# Patient Record
Sex: FEMALE | Race: WHITE | HISPANIC OR LATINO | URBAN - METROPOLITAN AREA
[De-identification: names, ages, dates, MRNs, and addresses within clinical notes are randomized per-mention and may not be internally consistent; named-entity substitution may affect disease eponyms.]

---

## 2019-09-24 ENCOUNTER — APPOINTMENT (RX ONLY)
Age: 64
Setting detail: DERMATOLOGY
End: 2019-09-24

## 2019-09-24 ENCOUNTER — APPOINTMENT (OUTPATIENT)
Age: 64
Setting detail: DERMATOLOGY
End: 2019-09-24

## 2019-09-24 VITALS
DIASTOLIC BLOOD PRESSURE: 90 MMHG | SYSTOLIC BLOOD PRESSURE: 130 MMHG | SYSTOLIC BLOOD PRESSURE: 130 MMHG | DIASTOLIC BLOOD PRESSURE: 90 MMHG

## 2019-09-24 DIAGNOSIS — L57.8 OTHER SKIN CHANGES DUE TO CHRONIC EXPOSURE TO NONIONIZING RADIATION: ICD-10-CM

## 2019-09-24 DIAGNOSIS — D22 MELANOCYTIC NEVI: ICD-10-CM

## 2019-09-24 DIAGNOSIS — L82.1 OTHER SEBORRHEIC KERATOSIS: ICD-10-CM

## 2019-09-24 DIAGNOSIS — L81.4 OTHER MELANIN HYPERPIGMENTATION: ICD-10-CM

## 2019-09-24 DIAGNOSIS — D485 NEOPLASM OF UNCERTAIN BEHAVIOR OF SKIN: ICD-10-CM

## 2019-09-24 PROBLEM — D22.5 MELANOCYTIC NEVI OF TRUNK: Status: ACTIVE | Noted: 2019-09-24

## 2019-09-24 PROBLEM — F41.9 ANXIETY DISORDER, UNSPECIFIED: Status: ACTIVE | Noted: 2019-09-24

## 2019-09-24 PROBLEM — D48.5 NEOPLASM OF UNCERTAIN BEHAVIOR OF SKIN: Status: ACTIVE | Noted: 2019-09-24

## 2019-09-24 PROBLEM — I10 ESSENTIAL (PRIMARY) HYPERTENSION: Status: ACTIVE | Noted: 2019-09-24

## 2019-09-24 PROBLEM — E03.9 HYPOTHYROIDISM, UNSPECIFIED: Status: ACTIVE | Noted: 2019-09-24

## 2019-09-24 PROBLEM — D22.62 MELANOCYTIC NEVI OF LEFT UPPER LIMB, INCLUDING SHOULDER: Status: ACTIVE | Noted: 2019-09-24

## 2019-09-24 PROBLEM — E78.5 HYPERLIPIDEMIA, UNSPECIFIED: Status: ACTIVE | Noted: 2019-09-24

## 2019-09-24 PROCEDURE — ? BIOPSY BY SHAVE METHOD

## 2019-09-24 PROCEDURE — ? BIOPSY BY PUNCH METHOD

## 2019-09-24 PROCEDURE — ? COUNSELING

## 2019-09-24 PROCEDURE — 11104 PUNCH BX SKIN SINGLE LESION: CPT

## 2019-09-24 PROCEDURE — 99203 OFFICE O/P NEW LOW 30 MIN: CPT | Mod: 25

## 2019-09-24 PROCEDURE — 11103 TANGNTL BX SKIN EA SEP/ADDL: CPT

## 2019-09-24 ASSESSMENT — LOCATION SIMPLE DESCRIPTION DERM
LOCATION SIMPLE: RIGHT UPPER BACK
LOCATION SIMPLE: RIGHT CHEEK
LOCATION SIMPLE: RIGHT FOREARM
LOCATION SIMPLE: LEFT SHOULDER
LOCATION SIMPLE: RIGHT THIGH
LOCATION SIMPLE: RIGHT FOREHEAD
LOCATION SIMPLE: LEFT CHEEK
LOCATION SIMPLE: LEFT LOWER BACK
LOCATION SIMPLE: RIGHT UPPER BACK
LOCATION SIMPLE: LEFT UPPER BACK
LOCATION SIMPLE: RIGHT FOREARM
LOCATION SIMPLE: CHEST
LOCATION SIMPLE: ABDOMEN
LOCATION SIMPLE: LEFT CALF
LOCATION SIMPLE: RIGHT CHEEK
LOCATION SIMPLE: LEFT HAND
LOCATION SIMPLE: ABDOMEN
LOCATION SIMPLE: RIGHT THIGH
LOCATION SIMPLE: LEFT FOREHEAD
LOCATION SIMPLE: LEFT FOREARM
LOCATION SIMPLE: LEFT UPPER BACK
LOCATION SIMPLE: CHEST
LOCATION SIMPLE: LEFT CHEEK
LOCATION SIMPLE: LEFT SHOULDER
LOCATION SIMPLE: RIGHT FOREHEAD
LOCATION SIMPLE: LEFT FOREHEAD
LOCATION SIMPLE: LEFT FOREARM
LOCATION SIMPLE: LEFT LOWER BACK
LOCATION SIMPLE: LEFT HAND
LOCATION SIMPLE: LEFT CALF

## 2019-09-24 ASSESSMENT — LOCATION DETAILED DESCRIPTION DERM
LOCATION DETAILED: LEFT PROXIMAL DORSAL FOREARM
LOCATION DETAILED: SUBXIPHOID
LOCATION DETAILED: SUBXIPHOID
LOCATION DETAILED: LEFT MEDIAL SUPERIOR CHEST
LOCATION DETAILED: LEFT INFERIOR MEDIAL MALAR CHEEK
LOCATION DETAILED: LEFT DISTAL CALF
LOCATION DETAILED: LEFT INFERIOR MEDIAL FOREHEAD
LOCATION DETAILED: LEFT POSTERIOR SHOULDER
LOCATION DETAILED: RIGHT LATERAL FOREHEAD
LOCATION DETAILED: LEFT INFERIOR MEDIAL FOREHEAD
LOCATION DETAILED: RIGHT PROXIMAL DORSAL FOREARM
LOCATION DETAILED: LEFT MID-UPPER BACK
LOCATION DETAILED: RIGHT SUPERIOR UPPER BACK
LOCATION DETAILED: LEFT POSTERIOR SHOULDER
LOCATION DETAILED: RIGHT LATERAL FOREHEAD
LOCATION DETAILED: LEFT INFERIOR MEDIAL MIDBACK
LOCATION DETAILED: RIGHT PROXIMAL DORSAL FOREARM
LOCATION DETAILED: LEFT MEDIAL SUPERIOR CHEST
LOCATION DETAILED: RIGHT LATERAL ABDOMEN
LOCATION DETAILED: RIGHT LATERAL ABDOMEN
LOCATION DETAILED: RIGHT INFERIOR UPPER BACK
LOCATION DETAILED: LEFT MID-UPPER BACK
LOCATION DETAILED: RIGHT ANTERIOR DISTAL THIGH
LOCATION DETAILED: LEFT PROXIMAL DORSAL FOREARM
LOCATION DETAILED: EPIGASTRIC SKIN
LOCATION DETAILED: LEFT RADIAL DORSAL HAND
LOCATION DETAILED: LEFT INFERIOR MEDIAL MALAR CHEEK
LOCATION DETAILED: RIGHT INFERIOR UPPER BACK
LOCATION DETAILED: RIGHT ANTERIOR DISTAL THIGH
LOCATION DETAILED: EPIGASTRIC SKIN
LOCATION DETAILED: RIGHT SUPERIOR MEDIAL MALAR CHEEK
LOCATION DETAILED: LEFT DISTAL CALF
LOCATION DETAILED: LEFT INFERIOR MEDIAL MIDBACK
LOCATION DETAILED: LEFT RADIAL DORSAL HAND
LOCATION DETAILED: RIGHT SUPERIOR UPPER BACK
LOCATION DETAILED: RIGHT SUPERIOR MEDIAL MALAR CHEEK

## 2019-09-24 ASSESSMENT — LOCATION ZONE DERM
LOCATION ZONE: HAND
LOCATION ZONE: FACE
LOCATION ZONE: TRUNK
LOCATION ZONE: ARM
LOCATION ZONE: TRUNK
LOCATION ZONE: LEG
LOCATION ZONE: HAND
LOCATION ZONE: LEG
LOCATION ZONE: ARM
LOCATION ZONE: FACE

## 2019-09-24 NOTE — PROCEDURE: BIOPSY BY SHAVE METHOD
Depth Of Biopsy: dermis
Additional Anesthesia Volume In Cc (Will Not Render If 0): 0
Cryotherapy Text: The wound bed was treated with cryotherapy after the biopsy was performed.
Notification Instructions: Patient will be notified of biopsy results. However, patient instructed to call the office if not contacted within 2 weeks.
Anesthesia Type: 0.5% lidocaine with 1:100,000 epinephrine and a 1:10 solution of 8.4% sodium bicarbonate
Size Of Lesion In Cm: 0.6
Destruction After The Procedure: No
Lab: [id number]
Consent: Written consent was obtained and risks were reviewed including but not limited to scarring, infection, bleeding, scabbing, incomplete removal, nerve damage and allergy to anesthesia.
Silver Nitrate Text: The wound bed was treated with silver nitrate after the biopsy was performed.
Detail Level: Detailed
Biopsy Method: double edge Personna blade
Hemostasis: Aluminum Chloride
Was A Bandage Applied: Yes
Dressing: bandage
Electrodesiccation And Curettage Text: The wound bed was treated with electrodesiccation and curettage after the biopsy was performed.
Anesthesia Volume In Cc: 0.5
Type Of Destruction Used: Curettage
Path Notes (To The Dermatopathologist): 0.6cm
Billing Type: Third-Party Bill
Biopsy Type: H and E
Electrodesiccation Text: The wound bed was treated with electrodesiccation after the biopsy was performed.
Post-Care Instructions: I reviewed with the patient in detail post-care instructions. Patient is to keep the biopsy site dry overnight, and then apply bacitracin twice daily until healed. Patient may apply hydrogen peroxide soaks to remove any crusting.
Wound Care: Bacitracin
Lab Facility: [id number]

## 2019-09-24 NOTE — PROCEDURE: BIOPSY BY PUNCH METHOD
Render Path Notes In Note?: No
Biopsy Type: H and E
Billing Type: Third-Party Bill
Hemostasis: None
Lab: [id number]
Dressing: pressure dressing with telfa
Post-Care Instructions: I reviewed with the patient in detail post-care instructions. Patient is to keep the biopsy site dry overnight, and then apply bacitracin twice daily until healed. Patient may apply hydrogen peroxide soaks to remove any crusting.
Anesthesia Type: 0.5% lidocaine with 1:200,000 epinephrine and a 1:10 solution of 8.4% sodium bicarbonate
Additional Anesthesia Volume In Cc (Will Not Render If 0): 0
Lab Facility: [id number]
Size Of Lesion In Cm (Optional): 0.6
Home Suture Removal Text: Patient was provided a home suture removal kit and will remove their sutures at home.  If they have any questions or difficulties they will call the office.
Path Notes (To The Dermatopathologist): 0.6cm
Detail Level: Detailed
Punch Size In Mm: 2
Suture Removal: 7 days
Consent: Written consent was obtained and risks were reviewed including but not limited to scarring, infection, bleeding, scabbing, incomplete removal, nerve damage and allergy to anesthesia.
Anesthesia Volume In Cc: 0.5
Notification Instructions: Patient will be notified of biopsy results. However, patient instructed to call the office if not contacted within 2 weeks.
Wound Care: Bacitracin
Was A Bandage Applied: Yes
Epidermal Sutures: 6-0 Nylon

## 2019-09-24 NOTE — PROCEDURE: BIOPSY BY SHAVE METHOD
Billing Type: Third-Party Bill
Biopsy Method: double edge Personna blade
Render Post-Care Instructions In Note?: no
Electrodesiccation And Curettage Text: The wound bed was treated with electrodesiccation and curettage after the biopsy was performed.
Anesthesia Type: 0.5% lidocaine with 1:100,000 epinephrine and a 1:10 solution of 8.4% sodium bicarbonate
Electrodesiccation Text: The wound bed was treated with electrodesiccation after the biopsy was performed.
Anesthesia Volume In Cc: 0.5
Consent: Written consent was obtained and risks were reviewed including but not limited to scarring, infection, bleeding, scabbing, incomplete removal, nerve damage and allergy to anesthesia.
Path Notes (To The Dermatopathologist): 0.6cm
Silver Nitrate Text: The wound bed was treated with silver nitrate after the biopsy was performed.
X Size Of Lesion In Cm: 0
Notification Instructions: Patient will be notified of biopsy results. However, patient instructed to call the office if not contacted within 2 weeks.
Cryotherapy Text: The wound bed was treated with cryotherapy after the biopsy was performed.
Dressing: bandage
Size Of Lesion In Cm: 0.6
Detail Level: Detailed
Depth Of Biopsy: dermis
Post-Care Instructions: I reviewed with the patient in detail post-care instructions. Patient is to keep the biopsy site dry overnight, and then apply bacitracin twice daily until healed. Patient may apply hydrogen peroxide soaks to remove any crusting.
Type Of Destruction Used: Curettage
Biopsy Type: H and E
Was A Bandage Applied: Yes
Wound Care: Bacitracin
Hemostasis: Aluminum Chloride

## 2019-10-02 ENCOUNTER — APPOINTMENT (OUTPATIENT)
Age: 64
Setting detail: DERMATOLOGY
End: 2019-10-02

## 2019-10-02 ENCOUNTER — APPOINTMENT (RX ONLY)
Age: 64
Setting detail: DERMATOLOGY
End: 2019-10-02

## 2019-10-02 DIAGNOSIS — Z41.9 ENCOUNTER FOR PROCEDURE FOR PURPOSES OTHER THAN REMEDYING HEALTH STATE, UNSPECIFIED: ICD-10-CM

## 2019-10-02 PROCEDURE — ? PRESCRIPTION

## 2019-10-02 PROCEDURE — ? COSMETIC CONSULTATION: BOTULINUM TOXIN

## 2019-10-02 PROCEDURE — ? MICRODERMABRASION

## 2019-10-02 PROCEDURE — ? PRODUCT LINE (HYPERPIGMENTATION)

## 2019-10-02 PROCEDURE — ? COSMETIC CONSULTATION: CHEMICAL PEELS

## 2019-10-02 RX ORDER — HYDROQUINONE 4 %
CREAM (GRAM) TOPICAL
Qty: 2 | Refills: 0 | Status: ACTIVE

## 2019-10-02 ASSESSMENT — LOCATION DETAILED DESCRIPTION DERM
LOCATION DETAILED: LEFT FOREHEAD
LOCATION DETAILED: NASAL SUPRATIP
LOCATION DETAILED: LEFT RADIAL DORSAL HAND
LOCATION DETAILED: RIGHT SUPERIOR LATERAL MALAR CHEEK
LOCATION DETAILED: RIGHT INFERIOR CENTRAL MALAR CHEEK
LOCATION DETAILED: LEFT SUPERIOR CENTRAL MALAR CHEEK
LOCATION DETAILED: LEFT SUPERIOR CENTRAL MALAR CHEEK
LOCATION DETAILED: LEFT RADIAL DORSAL HAND
LOCATION DETAILED: RIGHT ULNAR DORSAL HAND
LOCATION DETAILED: LEFT FOREHEAD
LOCATION DETAILED: NASAL SUPRATIP
LOCATION DETAILED: RIGHT SUPERIOR LATERAL MALAR CHEEK
LOCATION DETAILED: RIGHT INFERIOR CENTRAL MALAR CHEEK
LOCATION DETAILED: RIGHT ULNAR DORSAL HAND
LOCATION DETAILED: LEFT INFERIOR CENTRAL MALAR CHEEK
LOCATION DETAILED: LEFT INFERIOR CENTRAL MALAR CHEEK

## 2019-10-02 ASSESSMENT — LOCATION SIMPLE DESCRIPTION DERM
LOCATION SIMPLE: RIGHT CHEEK
LOCATION SIMPLE: LEFT FOREHEAD
LOCATION SIMPLE: LEFT HAND
LOCATION SIMPLE: LEFT HAND
LOCATION SIMPLE: LEFT CHEEK
LOCATION SIMPLE: NOSE
LOCATION SIMPLE: LEFT FOREHEAD
LOCATION SIMPLE: NOSE
LOCATION SIMPLE: LEFT CHEEK
LOCATION SIMPLE: RIGHT CHEEK
LOCATION SIMPLE: RIGHT HAND
LOCATION SIMPLE: RIGHT HAND

## 2019-10-02 ASSESSMENT — LOCATION ZONE DERM
LOCATION ZONE: FACE
LOCATION ZONE: FACE
LOCATION ZONE: NOSE
LOCATION ZONE: HAND
LOCATION ZONE: NOSE
LOCATION ZONE: HAND

## 2019-10-02 NOTE — PROCEDURE: PRODUCT LINE (HYPERPIGMENTATION)
Product 6 Application Directions: Apply at bedtime to entire face 3 x week. Protect skin from the sun and apply SPF QD
Product 57 Units: 0
Product 5 Application Directions: Apply to entire face BID
Product 4 Application Directions: Cleanse QAM
Product 2 Application Directions: Apply to entire face QAM
Product 26 Price (In Dollars - Numeric Only, No Special Characters Or $): 140
Product 65 Price (In Dollars - Numeric Only, No Special Characters Or $): 0.00
Name Of Product 16: Wrinkle texture repair
Product 3 Price (In Dollars - Numeric Only, No Special Characters Or $): 71.36
Name Of Product 12: Calming toner
Name Of Product 3: Exfoliating polish
Product 23 Application Directions: Apply to entire face 3-4 x week at bedtime
Name Of Product 28: Colorscience Total Eye
Product 1 Price (In Dollars - Numeric Only, No Special Characters Or $): 120
Name Of Product 21: Colorscience Face Shield
Name Of Product 22: Colorscience Lip Shine SPF
Product 8 Price (In Dollars - Numeric Only, No Special Characters Or $): 85.20
Product 2 Price (In Dollars - Numeric Only, No Special Characters Or $): 125.00
Product 3 Application Directions: 2-3 times a week in the AM
Product 17 Application Directions: Apply QAM
Name Of Product 6: Retinol Brightener 0.5%
Product 8 Application Directions: Scrub face QAM in shower
Name Of Product 26: Refissa 40mg
Product 7 Price (In Dollars - Numeric Only, No Special Characters Or $): 64.00
Render Product Pricing In Note: Yes
Name Of Product 29: Acne Kit
Name Of Product 4: Clarisonic Maile 2
Product 27 Application Directions: 3 x week after cleansing
Product 11 Price (In Dollars - Numeric Only, No Special Characters Or $): 69.23
Name Of Product 18: Growth Factor Serum
Product 13 Price (In Dollars - Numeric Only, No Special Characters Or $): 54.32
Product 12 Application Directions: Apply after cleansing BID
Product 15 Price (In Dollars - Numeric Only, No Special Characters Or $): 63.90
Product 25 Application Directions: QHS PRN
Name Of Product 27: Exfoliating polish travel size
Product 7 Units: 1
Name Of Product 11: SPF 30 Primer
Product 23 Price (In Dollars - Numeric Only, No Special Characters Or $): 110
Product 4 Price (In Dollars - Numeric Only, No Special Characters Or $): 158.70
Product 7 Application Directions: Apply to entire face QHS
Name Of Product 24: Oil Control pads
Product 27 Price (In Dollars - Numeric Only, No Special Characters Or $): 19.17
Name Of Product 9: Smartone SPF 50
Name Of Product 2: Melamin C RX with HQ 4%
Product 24 Application Directions: BID post cleansing
Product 9 Price (In Dollars - Numeric Only, No Special Characters Or $): 74.55
Product 24 Price (In Dollars - Numeric Only, No Special Characters Or $): 66.03
Product 6 Price (In Dollars - Numeric Only, No Special Characters Or $): 110.76
Product 16 Application Directions: Apply QHS
Product 16 Price (In Dollars - Numeric Only, No Special Characters Or $): 154.42
Name Of Product 19: C-Bright
Product 22 Application Directions: Apply to lips QAM
Name Of Product 17: Exfoliation accelerator
Product 28 Price (In Dollars - Numeric Only, No Special Characters Or $): 73.49
Product 25 Price (In Dollars - Numeric Only, No Special Characters Or $): 155.15
Product 21 Application Directions: Apply QAM before makeup
Name Of Product 13: Complexion Renewal pads
Name Of Product 10: Daily Power Defense
Product 26 Application Directions: QHS as tolerated
Product 10 Price (In Dollars - Numeric Only, No Special Characters Or $): 159.76
Name Of Product 14: Gentle cleanser
Name Of Product 5: Pigment Control HQ4%
Product 29 Application Directions: Apply as directed
Detail Level: Zone
Product 22 Price (In Dollars - Numeric Only, No Special Characters Or $): 30.89
Product 19 Application Directions: Apply to entire face BID alternating with HQ4% Melamin C and Melamix to prevent rebound Hyperpigmentation.
Name Of Product 7: Pigment control blending HQ4%
Product 1 Application Directions: 1 pump at bedtime every QHS
Product 18 Application Directions: Apply to entire face QHS alternating with Tretinoin
Product 15 Application Directions: Apply BID after toner
Name Of Product 23: Tretinoin 0.05%
Product 14 Application Directions: Apply to face after cleansing
Product 20 Application Directions: Cleanse face qhs
Name Of Product 8: Vitascrub
Product 18 Price (In Dollars - Numeric Only, No Special Characters Or $): 157.12
Name Of Product 25: Wrinkle and texture repair
Product 13 Application Directions: Apply to entire face after wash BID
Name Of Product 15: Pore refiner
Product 19 Price (In Dollars - Numeric Only, No Special Characters Or $): 99.03
Product 12 Price (In Dollars - Numeric Only, No Special Characters Or $): 39.41
Product 5 Price (In Dollars - Numeric Only, No Special Characters Or $): 66.00
Name Of Product 1: Brightenex 1%
Product 21 Price (In Dollars - Numeric Only, No Special Characters Or $): 41.54
Product 29 Price (In Dollars - Numeric Only, No Special Characters Or $): 149.8
Product 14 Price (In Dollars - Numeric Only, No Special Characters Or $): 47.93

## 2019-10-02 NOTE — PROCEDURE: MICRODERMABRASION
Endpoint: mild erythema
Consent: Written consent obtained, risks reviewed including but not limited to crusting, scabbing, blistering, scarring, darker or lighter pigmentary change, bruising, and/or incomplete response.
Wand: Medium
Vacuum Pressure Units: inches Hg
Comments: Stimulator peel applied to face and dorsal hands
Number Of Passes: 2
Prep Text: The patients skin was cleaned and prepped.
Treatment Number: 1
Post-Care Instructions: I reviewed with the patient in detail post-care instructions. Patient should stay away from the sun and wear sun protection until treated areas are fully healed.\\Phillip abrasive solutions such as Glycolic acid, retinoic acid and all wash cloths, sponges and or scrubs must be avoided.
Vacuum Pressure: 15
Price (Use Numbers Only, No Special Characters Or $): 80
Indication: skin texture
Detail Level: Zone

## 2019-10-02 NOTE — HPI: COSMETIC (MICRODERMABRASION)
previous_has_had_previous_microdermabrasion
When Outside In The Sun, Do You...: always burns, never tans

## 2019-10-02 NOTE — PROCEDURE: MICRODERMABRASION
Detail Level: Zone
Vacuum Pressure Units: inches Hg
Wand: Medium
Post-Care Instructions: I reviewed with the patient in detail post-care instructions. Patient should stay away from the sun and wear sun protection until treated areas are fully healed.\\José Antonio abrasive solutions such as Glycolic acid, retinoic acid and all wash cloths, sponges and or scrubs must be avoided.
Comments: Stimulator peel applied to face and dorsal hands
Endpoint: mild erythema
Vacuum Pressure: 15
Consent: Written consent obtained, risks reviewed including but not limited to crusting, scabbing, blistering, scarring, darker or lighter pigmentary change, bruising, and/or incomplete response.
Indication: skin texture
Price (Use Numbers Only, No Special Characters Or $): 80
Number Of Passes: 2
Treatment Number: 1
Prep Text: The patients skin was cleaned and prepped.

## 2019-10-02 NOTE — PROCEDURE: PRODUCT LINE (HYPERPIGMENTATION)
Product 21 Application Directions: Apply QAM before makeup
Product 4 Application Directions: Cleanse QAM
Product 21 Units: 1
Product 48 Price (In Dollars - Numeric Only, No Special Characters Or $): 0.00
Product 61 Units: 0
Product 27 Price (In Dollars - Numeric Only, No Special Characters Or $): 19.17
Product 15 Price (In Dollars - Numeric Only, No Special Characters Or $): 63.90
Product 2 Price (In Dollars - Numeric Only, No Special Characters Or $): 125.00
Product 7 Price (In Dollars - Numeric Only, No Special Characters Or $): 64.00
Name Of Product 22: Colorscience Lip Shine SPF
Product 18 Application Directions: Apply to entire face QHS alternating with Tretinoin
Product 24 Application Directions: BID post cleansing
Product 12 Application Directions: Apply after cleansing BID
Product 21 Price (In Dollars - Numeric Only, No Special Characters Or $): 41.54
Name Of Product 10: Daily Power Defense
Name Of Product 5: Pigment Control HQ4%
Product 27 Application Directions: 3 x week after cleansing
Send Charges To Patient Encounter: Yes
Name Of Product 25: Wrinkle and texture repair
Name Of Product 21: Colorscience Face Shield
Product 15 Application Directions: Apply BID after toner
Product 22 Price (In Dollars - Numeric Only, No Special Characters Or $): 30.89
Product 7 Application Directions: Apply to entire face QHS
Product 17 Application Directions: Apply QAM
Name Of Product 13: Complexion Renewal pads
Product 2 Application Directions: Apply to entire face QAM
Product 10 Price (In Dollars - Numeric Only, No Special Characters Or $): 159.56
Product 18 Price (In Dollars - Numeric Only, No Special Characters Or $): 157.77
Name Of Product 28: Colorscience Total Eye
Product 25 Price (In Dollars - Numeric Only, No Special Characters Or $): 155.15
Product 13 Price (In Dollars - Numeric Only, No Special Characters Or $): 54.32
Product 5 Price (In Dollars - Numeric Only, No Special Characters Or $): 66.00
Product 20 Application Directions: Cleanse face qhs
Name Of Product 16: Wrinkle texture repair
Name Of Product 8: Vitascrub
Name Of Product 3: Exfoliating polish
Product 22 Application Directions: Apply to lips QAM
Name Of Product 18: Growth Factor Serum
Product 17 Price (In Dollars - Numeric Only, No Special Characters Or $): 74.55
Product 28 Price (In Dollars - Numeric Only, No Special Characters Or $): 73.49
Product 8 Price (In Dollars - Numeric Only, No Special Characters Or $): 85.20
Product 16 Price (In Dollars - Numeric Only, No Special Characters Or $): 154.42
Product 5 Application Directions: Apply to entire face BID
Product 20 Price (In Dollars - Numeric Only, No Special Characters Or $): 140
Name Of Product 23: Tretinoin 0.05%
Product 13 Application Directions: Apply to entire face after wash BID
Product 25 Application Directions: QHS PRN
Product 3 Price (In Dollars - Numeric Only, No Special Characters Or $): 71.36
Name Of Product 11: SPF 30 Primer
Name Of Product 17: Exfoliation accelerator
Name Of Product 1: Brightenex 1%
Product 23 Price (In Dollars - Numeric Only, No Special Characters Or $): 110
Product 11 Price (In Dollars - Numeric Only, No Special Characters Or $): 69.23
Name Of Product 26: Refissa 40mg
Name Of Product 6: Retinol Brightener 0.5%
Name Of Product 14: Gentle cleanser
Product 16 Application Directions: Apply QHS
Product 8 Application Directions: Scrub face QAM in shower
Product 3 Application Directions: 2-3 times a week in the AM
Product 14 Price (In Dollars - Numeric Only, No Special Characters Or $): 47.93
Product 6 Price (In Dollars - Numeric Only, No Special Characters Or $): 110.76
Name Of Product 29: Acne Kit
Product 1 Price (In Dollars - Numeric Only, No Special Characters Or $): 120
Product 19 Application Directions: Apply to entire face BID alternating with HQ4% Melamin C and Melamix to prevent rebound Hyperpigmentation.
Name Of Product 9: Smartone SPF 50
Product 23 Application Directions: Apply to entire face 3-4 x week at bedtime
Detail Level: Zone
Name Of Product 4: Clarisonic Veronica 2
Product 29 Price (In Dollars - Numeric Only, No Special Characters Or $): 149.8
Name Of Product 24: Oil Control pads
Product 26 Application Directions: QHS as tolerated
Product 14 Application Directions: Apply to face after cleansing
Product 19 Price (In Dollars - Numeric Only, No Special Characters Or $): 99.03
Name Of Product 12: Calming toner
Product 1 Application Directions: 1 pump at bedtime every QHS
Product 4 Price (In Dollars - Numeric Only, No Special Characters Or $): 158.92
Product 6 Application Directions: Apply at bedtime to entire face 3 x week. Protect skin from the sun and apply SPF QD
Product 24 Price (In Dollars - Numeric Only, No Special Characters Or $): 66.03
Product 12 Price (In Dollars - Numeric Only, No Special Characters Or $): 39.41
Name Of Product 27: Exfoliating polish travel size
Name Of Product 2: Melamin C RX with HQ 4%
Name Of Product 15: Pore refiner
Name Of Product 7: Pigment control blending HQ4%
Product 29 Application Directions: Apply as directed
Name Of Product 19: C-Bright

## 2019-10-17 NOTE — PROCEDURE: BIOPSY BY PUNCH METHOD
Family called and informed of patient's progress at start of case    GONZALEZ WAS GIVEN TO THE STERILE FIELD TO BE USED BY MD DURING PROCEDURE  REF: [id number]  LOT: [id number]  EXP: 6/28/24
X Size Of Lesion In Cm (Optional): 0
Patient Will Remove Sutures At Home?: No
Dressing: pressure dressing with telfa
Biopsy Type: H and E
Size Of Lesion In Cm (Optional): 0.6
Was A Bandage Applied: Yes
Suture Removal: 7 days
Detail Level: Detailed
Anesthesia Volume In Cc: 0.5
Wound Care: Bacitracin
Path Notes (To The Dermatopathologist): 0.6cm
Epidermal Sutures: 6-0 Nylon
Consent: Written consent was obtained and risks were reviewed including but not limited to scarring, infection, bleeding, scabbing, incomplete removal, nerve damage and allergy to anesthesia.
Punch Size In Mm: 2
Hemostasis: None
Billing Type: Third-Party Bill
Anesthesia Type: 0.5% lidocaine with 1:200,000 epinephrine and a 1:10 solution of 8.4% sodium bicarbonate
Home Suture Removal Text: Patient was provided a home suture removal kit and will remove their sutures at home.  If they have any questions or difficulties they will call the office.
Notification Instructions: Patient will be notified of biopsy results. However, patient instructed to call the office if not contacted within 2 weeks.
Post-Care Instructions: I reviewed with the patient in detail post-care instructions. Patient is to keep the biopsy site dry overnight, and then apply bacitracin twice daily until healed. Patient may apply hydrogen peroxide soaks to remove any crusting.

## 2021-01-25 ENCOUNTER — APPOINTMENT (RX ONLY)
Dept: URBAN - METROPOLITAN AREA CLINIC 77 | Facility: CLINIC | Age: 66
Setting detail: DERMATOLOGY
End: 2021-01-25

## 2021-01-25 ENCOUNTER — APPOINTMENT (OUTPATIENT)
Age: 66
Setting detail: DERMATOLOGY
End: 2021-01-25

## 2021-01-25 ENCOUNTER — APPOINTMENT (RX ONLY)
Dept: URBAN - METROPOLITAN AREA CLINIC 168 | Facility: CLINIC | Age: 66
Setting detail: DERMATOLOGY
End: 2021-01-25

## 2021-01-25 DIAGNOSIS — L29.89 OTHER PRURITUS: ICD-10-CM

## 2021-01-25 DIAGNOSIS — D485 NEOPLASM OF UNCERTAIN BEHAVIOR OF SKIN: ICD-10-CM

## 2021-01-25 PROBLEM — L29.8 OTHER PRURITUS: Status: ACTIVE | Noted: 2021-01-25

## 2021-01-25 PROBLEM — D48.5 NEOPLASM OF UNCERTAIN BEHAVIOR OF SKIN: Status: ACTIVE | Noted: 2021-01-25

## 2021-01-25 PROBLEM — I10 ESSENTIAL (PRIMARY) HYPERTENSION: Status: ACTIVE | Noted: 2021-01-25

## 2021-01-25 PROCEDURE — ? PRESCRIPTION MEDICATION MANAGEMENT

## 2021-01-25 PROCEDURE — 99202 OFFICE O/P NEW SF 15 MIN: CPT | Mod: 25

## 2021-01-25 PROCEDURE — ? BIOPSY BY SHAVE METHOD

## 2021-01-25 PROCEDURE — ? COUNSELING

## 2021-01-25 PROCEDURE — 11102 TANGNTL BX SKIN SINGLE LES: CPT

## 2021-01-25 ASSESSMENT — LOCATION ZONE DERM
LOCATION ZONE: SCALP

## 2021-01-25 ASSESSMENT — LOCATION SIMPLE DESCRIPTION DERM
LOCATION SIMPLE: SCALP

## 2021-01-25 ASSESSMENT — LOCATION DETAILED DESCRIPTION DERM
LOCATION DETAILED: LEFT SUPERIOR PARIETAL SCALP

## 2021-01-25 NOTE — PROCEDURE: PRESCRIPTION MEDICATION MANAGEMENT
Continue Regimen: Patient is currently taking Gabapentin 300mg at night for neuropathy. Advised to increase to 600mg at night and 300mg in the am if she is not too sleepy.
Render In Strict Bullet Format?: No
Detail Level: Zone
Continue Regimen: Patient to continue using the Betamethasone cream and lidocaine gel
Detail Level: Simple
Plan: Patient to finish the rest of her oral antibiotic
Modify Regimen: Patient to increase Gabapentin 300mg from 1 pill QHS to take 1 pill QAM and 2 pills QHS

## 2021-01-25 NOTE — HPI: SKIN LESION
What Type Of Note Output Would You Prefer (Optional)?: Standard Output
How Severe Is Your Skin Lesion?: mild
Has Your Skin Lesion Been Treated?: not been treated
Is This A New Presentation, Or A Follow-Up?: Skin Lesions
Additional History: Patient reports the condition started in November, 2020. She states she was put on doxycycline and cephalexin for 10 days and has been using Betamethasone and lidocaine topicals prescribed by urgent care provider.

## 2021-01-25 NOTE — PROCEDURE: BIOPSY BY SHAVE METHOD
Render Path Notes In Note?: No
Electrodesiccation Text: The wound bed was treated with electrodesiccation after the biopsy was performed.
Billing Type: [employer]
Consent: Written consent was obtained and risks were reviewed including but not limited to scarring, infection, bleeding, scabbing, incomplete removal, nerve damage and allergy to anesthesia.
Anesthesia Volume In Cc (Will Not Render If 0): 2
Information: Selecting Yes will display possible errors in your note based on the variables you have selected. This validation is only offered as a suggestion for you. PLEASE NOTE THAT THE VALIDATION TEXT WILL BE REMOVED WHEN YOU FINALIZE YOUR NOTE. IF YOU WANT TO FAX A PRELIMINARY NOTE YOU WILL NEED TO TOGGLE THIS TO 'NO' IF YOU DO NOT WANT IT IN YOUR FAXED NOTE.
Notification Instructions: Pt may call office in 1 to 2 weeks for biopsy results.  If treatment is needed, pt will be notified of biopsy results
Additional Anesthesia Volume In Cc (Will Not Render If 0): 0
Type Of Destruction Used: Curettage
Silver Nitrate Text: The wound bed was treated with silver nitrate after the biopsy was performed.
Was A Bandage Applied: Yes
Wound Care: Polysporin ointment
Electrodesiccation And Curettage Text: The wound bed was treated with electrodesiccation and curettage after the biopsy was performed.
Anesthesia Type: 1% lidocaine without epinephrine
Biopsy Method: double edge Personna blade
Depth Of Biopsy: dermis
Cryotherapy Text: The wound bed was treated with cryotherapy after the biopsy was performed.
Hemostasis: Electrocautery
Biopsy Type: H and E
Dressing: no dressing applied
Detail Level: Detailed
Triangulation (Location Of Lesion In Relation To Distance From Anatomical Landmarks): 13cm x 12.5cm from left superior helix and 13.5cm from left frontal hairline
Post-Care Instructions: I reviewed with the patient in detail post-care instructions. Patient is to keep the biopsy site dry overnight, and then apply bacitracin twice daily until healed. Patient may apply hydrogen peroxide soaks to remove any crusting.

## 2024-09-23 NOTE — PROCEDURE: COSMETIC CONSULTATION: BOTULINUM TOXIN
PROCEDURE NOTE    IABP removal    Vascular sheath suture was released  Under manual compression proximal to sheath insertion site IABP catheter along with its vascular sheath was pulled out and hemostasis achieved with initial occlusive and later non occlusive pressure. Afre 12 minutes of compression and no active ooze Femstop device applied at 45 mmHg pressure for 1 hr. No hematoma noted  Thereafter to lay in bed x 6 hrs. Ambulate in am    No complication occurred      Rosa Head MD  
Detail Level: Zone
Price (Use Numbers Only, No Special Characters Or $): 0